# Patient Record
Sex: FEMALE | Race: WHITE | Employment: OTHER | URBAN - METROPOLITAN AREA
[De-identification: names, ages, dates, MRNs, and addresses within clinical notes are randomized per-mention and may not be internally consistent; named-entity substitution may affect disease eponyms.]

---

## 2017-08-23 ENCOUNTER — HOSPITAL ENCOUNTER (OUTPATIENT)
Dept: MRI IMAGING | Age: 72
Discharge: HOME | End: 2017-08-23
Attending: PSYCHIATRY & NEUROLOGY
Payer: MEDICARE

## 2017-08-23 VITALS — BODY MASS INDEX: 20.66 KG/M2 | WEIGHT: 128 LBS

## 2017-08-23 DIAGNOSIS — G43.019 COMMON MIGRAINE WITH INTRACTABLE MIGRAINE: ICD-10-CM

## 2017-08-23 LAB — CREAT UR-MCNC: 1.2 MG/DL (ref 0.6–1.3)

## 2017-08-23 PROCEDURE — 74011250636 HC RX REV CODE- 250/636: Performed by: PSYCHIATRY & NEUROLOGY

## 2017-08-23 PROCEDURE — 82565 ASSAY OF CREATININE: CPT

## 2017-08-23 PROCEDURE — 70553 MRI BRAIN STEM W/O & W/DYE: CPT

## 2017-08-23 PROCEDURE — A9577 INJ MULTIHANCE: HCPCS | Performed by: PSYCHIATRY & NEUROLOGY

## 2017-08-23 RX ADMIN — GADOBENATE DIMEGLUMINE 10 ML: 529 INJECTION, SOLUTION INTRAVENOUS at 12:26

## 2019-08-05 ENCOUNTER — HOSPITAL ENCOUNTER (OUTPATIENT)
Dept: GENERAL RADIOLOGY | Age: 74
Discharge: HOME | End: 2019-08-05
Attending: FAMILY MEDICINE
Payer: MEDICARE

## 2019-08-05 DIAGNOSIS — M85.89 OSTEOPENIA OF MULTIPLE SITES: ICD-10-CM

## 2019-08-05 PROCEDURE — 77080 DXA BONE DENSITY AXIAL: CPT

## 2020-07-21 ENCOUNTER — HOSPITAL ENCOUNTER (OUTPATIENT)
Dept: PHYSICAL THERAPY | Age: 75
Discharge: HOME | End: 2020-07-21
Payer: MEDICARE

## 2020-07-21 PROCEDURE — 97162 PT EVAL MOD COMPLEX 30 MIN: CPT

## 2020-07-21 NOTE — PROGRESS NOTES
Trish Whalen [address]  [facility] PHYSICAL THERAPY  [address] [facility] [address], [address] - Phone: [phone]  Fax: [phone] / [address] [facility]  Patient Name: Jonathon Gamino :    Medical   Diagnosis: Gait instability [R26.81]  Vestibular ataxic gait [R26.0] Treatment Diagnosis: Gait instability [R26.81]     Onset Date: 2020     Referral Source: Ermias Coello MD [facility]): 2020   Prior Hospitalization: See medical history Provider #: [id number]   Prior Level of Function: Independent with ADLs/IADLs, functional mobility and gait; prior h/o vertigo and migraine   Comorbidities: depression, osteoporosis, arthritis, visual impairment, hearing impairment, vertigo, migraines   Medications: Verified on Patient Summary List   The Plan of Care and following information is based on the information from the initial evaluation.   ===========================================================================================  Assessment / key information:  Patient is a 70 y.o. female who presents with complaints of sudden onset of vertigo, nausea, vomiting  and migraine 2020. Pt reports that nausea and vomiting persisted for 2-3 hours. Pt reports that once nausea and vomiting resolved, she performed self Epley maneuver, which resolved vertigo. Patient reports that migraine lasted 8 days. Pt reports that she has had intermittent dizziness with movement since that time, which occurs with rolling over in bed, looking up/reaching up into cabinets, bending down, turning around, changing elevation (sit to stand and occasionally stand to sit) and turning head. Patient demonstrates 1) forward head posture, 2) decreased C/S ROM, 3) dizziness with C/S ext, 4) dizziness with saccades/smooth pursuit/VOR, 5) negative Hallpike-Bryant and roll test, 6) impaired functional mobility and gait.   Patient would benefit from skilled PT services to address these issues and improve function. Thank you for this referral.    FOTO: 49/100  ==========================================================================================  Eval Complexity: History: HIGH Complexity :3+ comorbidities / personal factors will impact the outcome/ POC Exam:HIGH Complexity : 4+ Standardized tests and measures addressing body structure, function, activity limitation and / or participation in recreation  Presentation: MEDIUM Complexity : Evolving with changing characteristics  Clinical Decision Making:MEDIUM Complexity : FOTO score of 26-74Overall Complexity:MEDIUM    Problem List: pain affecting function, decrease ROM, decrease strength, impaired gait/ balance, decrease ADL/ functional abilitiies, decrease activity tolerance, decrease flexibility/ joint mobility and decrease transfer abilities   Treatment Plan may include any combination of the following: Therapeutic exercise, Therapeutic activities, Neuromuscular re-education, Physical agent/modality, Gait/balance training, Manual therapy, Patient education, Functional mobility training, Home safety training and Stair training  Patient / Family readiness to learn indicated by: asking questions, trying to perform skills and interest  Persons(s) to be included in education: patient (P)  Barriers to Learning/Limitations: None  Measures taken:    Patient Goal (s): \"Relief from dizziness. \"   Patient self reported health status: good  Rehabilitation Potential: good   Short Term Goals: To be accomplished in  4  weeks:  1. Patient will demonstrate compliance with HEP. 2. Patient will complete FGA to allow further assessment of gait and balance. 3. Patient will complete computerized dynamic posturography testing to allow further assessment of balance.  Long Term Goals: To be accomplished in  8  weeks:  1. Patient will demonstrate independence with HEP.   2. Patient will report 50% improvement in dizziness to allow improved activity tolerance and increased safety with functional mobility and gait. 3. Patient will score 72/100 on FOTO to indicate improved function. Frequency / Duration:   Patient to be seen  1-2  times per week for 8  weeks:  Patient / Caregiver education and instruction: activity modification and exercises    Therapist Signature: Eloisa Botello PT Date: 4/77/2103   Certification Period: 7/21/2020-10/20/2020 Time: 9:42 AM   ===========================================================================================  I certify that the above Physical Therapy Services are being furnished while the patient is under my care. I agree with the treatment plan and certify that this therapy is necessary. Physician Signature:        Date:       Time:     Please sign and return to [employer] or you may fax the signed copy to [phone]. Thank you.

## 2020-07-21 NOTE — PROGRESS NOTES
PHYSICAL THERAPY - DAILY TREATMENT NOTE    Patient Name: Hill Eagn        Date: 2020  : 1945   YES Patient  Verified  Visit #:   1     Insurance: Payor: [place] MED[place] / Plan: [place] MEDICARE PART A & B / Product Type: Medicare /      In time: 9:45 Out time: 10:17   Total Treatment Time: 32     Medicare/BCBS [place] Time Tracking (below)   Total Timed Codes (min):  0 1:1 Treatment Time:  0     TREATMENT AREA =  Peripheral vertigo    SUBJECTIVE    Pain Level (on 0 to 10 scale):  0  / 10   Medication Changes/New allergies or changes in medical history, any new surgeries or procedures? NO    If yes, update Summary List   Subjective Functional Status/Changes:  []  No changes reported     Pt reports dizziness which began 2020 as bout of vertigo and migraine. Pt reports that it took her a long time to recover. Pt reports that she has had vertigo in past and can usually do self Epley maneuver and correct vertigo. Pt reports that initially vertigo began after procedure to clean out frontal sinus ~ 3-4 years ago. Pt reports that she has been having migraines over past 4-5 years. Pt reports that she had increased nausea and vomiting with this episode of vertigo. Pt reports that nausea and vomiting lasted 2-3 hours. Pt reports that after 3-4 hours she was able to do self Epley maneuver, which resolved vertigo. Pt reports that migraine persisted for 8 days. Pt reports that dizziness persisted after migraine resolved. Pt reports that rolling over in bed, looking up/reaching up into cabinets, bending down, turning around, changing elevation (sit to stand and occasionally stand to sit) cause dizziness. Pt reports that dizziness is not present at rest, but occurs with head movements. Pt reports that she has not been driving due to dizziness. Pt reports that she underwent testing and Hearing and [address] [place] [address] and was told that she needs hearing aids.        OBJECTIVE    Physical Therapy Evaluation - Vestibular    Posture:  [] WNL      [x] Forward head    [x] Protracted shoulders    [] Retracted shoulders  [] Kyphosis:  [x] increased   [] decreased   [] Lordosis:   [] increased   [x] decreased  Other:    C/S ROM: [] WFL    [x] Limited    Describe: Decreased motion all directions; dizziness with C/S ext    Strength: [x] [facility]    [] Limited    Describe:    Optional Tests:  Sensation:  [x] Intact [] Diminished    Describe:    Proprioception: [x] Intact [] Diminished    Describe:    Coordination Testing:       Disdiadochokinesia [x] WFL    [] Impaired    Describe:       Heel - Tong  [] WFL    [] Impaired    Describe:       FNF   [] WFL    [] Impaired    Describe: Toe Tap   [x] [facility]    [] Impaired    Describe:    Oculomotor Tests: (Fixation Not Blocked)       Ocular ROM:   [x] WFL    [] Limited    Describe:       Spontaneous Nystag. [x] Neg     [] Pos    [] Left    [] Right       Gaze Holding Nystag. [x] Neg     [] Pos    [] Left    [] Right        Smooth Pursuit  [x] Neg     [] Pos    [] Left    [] Right  + dizziness       Saccades   [x] Neg     [] Pos    [] Left    [] Right  + dizziness       VOR - Slow Head Mvmt [x] Neg     [] Pos    [] Left    [] Right        VOR - Fast Head Mvmt [x] Neg     [] Pos    [] Left    [] Right        Head Thrust  [x] Neg     [] Pos    [] Left    [] Right  + dizziness       Static Visual Acuity [] Neg     [] Pos    [] Left    [] Right        Dynamic Visual Acuity [] Neg     [] Pos    [] Left    [] Right     Other Special Tests:       Vertebral Artery Testing [x] Neg     [] Pos    [] Left    [] Right       Hallpike-[employer] Maneuver [x] Neg     [] Pos    [] Left    [] Right + dizziness       Roll Test   [x] Neg     [] Pos    [] Left    [] Right + dizziness       Gonzalez Balance Scale [] Neg     [] Pos    Score:       Dynamic Gait Index [] Neg     [] Pos    Score:       Functional Gait Assess.  [] Neg     [] Pos    Score:    Balance Standard Testing (Eyes Open/Eyes Closed - EO/EC)       Romberg   [x] WFL    [] Pos    Describe: 30\"/30\"           Stand on Foam  [x] WFL    [] Pos    Describe: 30\"/30\"       Standing on Rail  [] WFL    [] Pos    Describe:        Sharpened Romberg [x] WFL    [] Pos    Describe: 30\" B EO       Single Leg Stand  [x] WFL    [] Pos    Describe: 7\" right/20\" left     Motion Sensitivity:  [] Neg     [] Pos    Describe:    Computerized Dynamic Posturography:        [] Not Tested    [] WFL    Score:     Other Tests:    During eval min Patient Education:  NO  Reviewed HEP   []  Progressed/Changed HEP based on:   Discussed POC     Other Objective/Functional Measures:    See eval     Post Treatment Pain Level (on 0 to 10) scale:   0  / 10     ASSESSMENT    Assessment/Changes in Function:     See plan of care    Justification for Eval Code Complexity:  Patient History : HIGH - depression, osteoporosis, arthritis, visual impairment, hearing impairment, vertigo, migraines  Examination HIGH - See objective  Clinical Presentation: MEDIUM  Clinical Decision Making : MEDIUM - FOTO 49/100     []  See Progress Note/Recertification   Patient will continue to benefit from skilled PT services: see plan of care   Progress toward goals / Updated goals:    See plan of care       PLAN    [x]  Upgrade activities as tolerated YES Continue plan of care   []  Discharge due to :    []  Other:      Therapist: Virginia Olivia, PT    Date: 7/21/2020 Time: 9:41 AM

## 2020-07-23 ENCOUNTER — HOSPITAL ENCOUNTER (OUTPATIENT)
Dept: PHYSICAL THERAPY | Age: 75
Discharge: HOME | End: 2020-07-23
Payer: MEDICARE

## 2020-07-23 PROCEDURE — 97112 NEUROMUSCULAR REEDUCATION: CPT

## 2020-07-23 PROCEDURE — 97530 THERAPEUTIC ACTIVITIES: CPT

## 2020-07-23 NOTE — PROGRESS NOTES
PHYSICAL THERAPY - DAILY TREATMENT NOTE    Patient Name: Rafael Michael        Date: 2020  : 1945   YES Patient  Verified  Visit #:  2     Insurance: Payor: Alfred Banks / Plan: [place] MEDICARE PART A & B / Product Type: Medicare /      In time: 1:45 Out time: 2:25   Total Treatment Time: 40     Medicare/BCBS [place] Time Tracking (below)   Total Timed Codes (min):  40 1:1 Treatment Time:  40     TREATMENT AREA =  Gait instability [R26.81]  Vestibular ataxic gait [R26.0]  SUBJECTIVE    Pain Level (on 0 to 10 scale):  0  / 10   Medication Changes/New allergies or changes in medical history, any new surgeries or procedures? NO    If yes, update Summary List   Subjective Functional Status/Changes:  []  No changes reported     Patient reports that she continues to have intermittent dizziness.           OBJECTIVE    15 min Therapeutic Activity: FTSST, FGA, 10MWT   Rationale: assess functional mobility and gait to improve the patient's ability to perform ADLs/IADLs, functional mobility and gait safely and independently without increased pain/symptoms      25 min Neuromuscular Re-ed: VSE, EO/EC balance, gait ex   Rationale: improve balance to improve the patient's ability to perform ADLs/IADLs, functional mobility and gait safely and independently without increased pain/symptoms      During NM min Patient Education:  YES  Reviewed HEP   [x]  Progressed/Changed HEP based on:   Initiated HEP (copy in chart)     Other Objective/Functional Measures:    FGA =   FTSST = 13\"  10MWT = 7\"    Initiated VSE, EO/EC balance on floor/foam, and gait ex per flowsheet     Post Treatment Pain Level (on 0 to 10) scale:   0  / 10     ASSESSMENT    Assessment/Changes in Function:     Tolerated exercises without complaints     []  See Progress Note/Recertification   Patient will continue to benefit from skilled PT services to modify and progress therapeutic interventions, address functional mobility deficits, address ROM deficits, address strength deficits, analyze and address soft tissue restrictions, analyze and cue movement patterns, analyze and modify body mechanics/ergonomics, assess and modify postural abnormalities, address imbalance/dizziness and instruct in home and community integration to attain remaining goals. Progress toward goals / Updated goals:    Progressing toward goals: · Short Term Goals: To be accomplished in  4  weeks:  1. Patient will demonstrate compliance with HEP. - Initiated HEP  2. Patient will complete FGA to allow further assessment of gait and balance. - FGA = 23/30  3. Patient will complete computerized dynamic posturography testing to allow further assessment of balance. · Long Term Goals: To be accomplished in  8  weeks:  1. Patient will demonstrate independence with HEP. 2. Patient will report 50% improvement in dizziness to allow improved activity tolerance and increased safety with functional mobility and gait. 3. Patient will score 72/100 on FOTO to indicate improved function.        PLAN    [x]  Upgrade activities as tolerated YES Continue plan of care   []  Discharge due to :    []  Other:      Therapist: Yaima Cummings PT    Date: 7/23/2020 Time: 1:52 PM     Future Appointments   Date Time Provider Yandy Ramos   7/31/2020  9:30 AM Irina Henry PT [facility]   8/4/2020  8:45 AM Irina Henry PT [facility]   8/5/2020  8:00 AM Irina Henry PT [facility]   8/11/2020  8:00 AM HITESH Lea [facility]   8/13/2020  8:00 AM Irina Henry PT [facility]   8/19/2020  8:00 AM Irina Henry PT [facility]   8/20/2020  8:45 AM Irina Henry PT [facility]   8/25/2020  8:00 AM Nathan Guerrero PT [facility]   8/26/2020  8:00 AM Nathan Guerrero, PT [facility]

## 2020-07-31 ENCOUNTER — HOSPITAL ENCOUNTER (OUTPATIENT)
Dept: PHYSICAL THERAPY | Age: 75
Discharge: HOME | End: 2020-07-31
Payer: MEDICARE

## 2020-07-31 PROCEDURE — 97112 NEUROMUSCULAR REEDUCATION: CPT

## 2020-07-31 PROCEDURE — 97110 THERAPEUTIC EXERCISES: CPT

## 2020-07-31 NOTE — PROGRESS NOTES
PHYSICAL THERAPY - DAILY TREATMENT NOTE    Patient Name: Mehreen Canela        Date: 2020  : 1945   YES Patient  Verified  Visit #:   3   of     Insurance: Payor: Geoff Contreras / Plan: [place] MEDICARE PART A & B / Product Type: Medicare /      In time: 9:30 Out time: 10:10   Total Treatment Time: 40     Medicare/BCBS [place] Time Tracking (below)   Total Timed Codes (min):  40 1:1 Treatment Time:  40     TREATMENT AREA =  Gait instability [R26.81]  Vestibular ataxic gait [R26.0]  SUBJECTIVE    Pain Level (on 0 to 10 scale):  4  / 10   Medication Changes/New allergies or changes in medical history, any new surgeries or procedures? NO    If yes, update Summary List   Subjective Functional Status/Changes:  []  No changes reported     Pt reports 4/10 headache, mild dizziness. Pt reports compliance with HEP, reports that EO/EC balance exercises on pillow are difficult. Pt reports episode of dizziness/nausea while doing dishes last week, reports that she had to go and lie down. OBJECTIVE    32 min Therapeutic Exercise:  [x]  See flow sheet   Rationale:      increase ROM, increase strength, improve coordination, improve balance and increase proprioception to improve the patients ability to perform ADLs/IADLs, functional mobility and gait safely and independently without increased pain/symptoms     8 min Neuromuscular Re-ed: Gait ex per flowsheet   Rationale: improve balance to improve the patient's ability to perform ADLs/IADLs, functional obility and gait safely and independently without increased pain/symptoms      During TE min Patient Education:  YES  Reviewed HEP   [x]  Progressed/Changed HEP based on:   Initiated LE strengthening HEP (see below)     Other Objective/Functional Measures:    Initiated Nustep and LE strengthening exercises per flowsheet    Access Code: [email or web address]   URL: [email or web address]   Date: 2020   Prepared by: Joseluis Hastings     Exercises Mini Squat with Counter Support - 10 reps - 1 sets - 3 second hold - 1x daily - 7x weekly   Standing Hip Flexion with Counter Support - 10 reps - 1 sets - 3 second hold - 1x daily - 7x weekly   Standing Hip Abduction with Counter Support - 10 reps - 1 sets - 3 second hold - 1x daily - 7x weekly   Standing Hip Extension with Counter Support - 10 reps - 1 sets - 3 second hold - 1x daily - 7x weekly   Lateral Step Up with Counter Support - 10 reps - 1 sets - 1x daily - 7x weekly   Forward Step Up with Counter Support - 10 reps - 1 sets - 1x daily - 7x weekly      Post Treatment Pain Level (on 0 to 10) scale:   4  / 10     ASSESSMENT    Assessment/Changes in Function:     Tolerated exercises without complaints except one episode of dizziness when turning around to perform lateral step ups on other side     []  See Progress Note/Recertification   Patient will continue to benefit from skilled PT services to modify and progress therapeutic interventions, address functional mobility deficits, address ROM deficits, address strength deficits, analyze and address soft tissue restrictions, analyze and cue movement patterns, analyze and modify body mechanics/ergonomics, assess and modify postural abnormalities, address imbalance/dizziness and instruct in home and community integration to attain remaining goals. Progress toward goals / Updated goals:    Progressing toward goals: · Short Term Goals: To be accomplished in  4  weeks:  1. Patient will demonstrate compliance with HEP. - Initiated LE strengthening HEP  2. Patient will complete FGA to allow further assessment of gait and balance. - Performed gait ex per flowsheet  3. Patient will complete computerized dynamic posturography testing to allow further assessment of balance. · Long Term Goals: To be accomplished in  8  weeks:  1. Patient will demonstrate independence with HEP.   2. Patient will report 50% improvement in dizziness to allow improved activity tolerance and increased safety with functional mobility and gait. 3. Patient will score 72/100 on FOTO to indicate improved function.        PLAN    [x]  Upgrade activities as tolerated YES Continue plan of care   []  Discharge due to :    []  Other:      Therapist: Emily Ramos PT    Date: 7/31/2020 Time: 9:36 AM     Future Appointments   Date Time Provider Yandy Ramos   8/4/2020  8:45 AM Lillie Dutton, PT [facility]   8/5/2020  8:00 AM Lillie Dutton, PT [facility]   8/11/2020  8:00 AM Sandhya Navarrete, PT [facility]   8/13/2020  8:00 AM Lillie Dutton, PT [facility]   8/19/2020  8:00 AM Lillie Dutton, PT [facility]   8/20/2020  8:45 AM Lillie Dutton, PT [facility]   8/25/2020  8:00 AM Sandhya Low, PT [facility]   8/26/2020  8:00 AM Sandhya Low, PT [facility]

## 2020-08-04 ENCOUNTER — APPOINTMENT (OUTPATIENT)
Dept: PHYSICAL THERAPY | Age: 75
End: 2020-08-04
Payer: MEDICARE

## 2020-08-05 ENCOUNTER — HOSPITAL ENCOUNTER (OUTPATIENT)
Dept: PHYSICAL THERAPY | Age: 75
Discharge: HOME | End: 2020-08-05
Payer: MEDICARE

## 2020-08-05 PROCEDURE — 97110 THERAPEUTIC EXERCISES: CPT

## 2020-08-05 PROCEDURE — 97112 NEUROMUSCULAR REEDUCATION: CPT

## 2020-08-05 NOTE — PROGRESS NOTES
PHYSICAL THERAPY - DAILY TREATMENT NOTE    Patient Name: Bernie Thomson        Date: 2020  : 1945   YES Patient  Verified  Visit #:   4     Insurance: Payor: Theo Dobbs / Plan: [place] MEDICARE PART A & B / Product Type: Medicare /      In time: 8:00 Out time: 8:42   Total Treatment Time: 42     Medicare/BCBS [place] Time Tracking (below)   Total Timed Codes (min):  42 1:1 Treatment Time:  42     TREATMENT AREA =  Gait instability [R26.81]  Vestibular ataxic gait [R26.0]  SUBJECTIVE    Pain Level (on 0 to 10 scale):  0  / 10   Medication Changes/New allergies or changes in medical history, any new surgeries or procedures? NO    If yes, update Summary List   Subjective Functional Status/Changes:  []  No changes reported     Pt reports mild dizziness, reports compliance with HEP. OBJECTIVE    8 min Therapeutic Exercise:  [x]  See flow sheet   Rationale:      increase ROM, increase strength, improve coordination, improve balance and increase proprioception to improve the patients ability to perform ADLs/IADLs, functional mobility and gait safely and independently without increased pain/symptoms     34 min Neuromuscular Re-ed: SOT, VSE, VVI   Rationale: assess and improve balance and improve vestibular function to improve the patient's ability to perform ADLs/IADLs, functional mobility and gait safely and independently without increased pain/symptoms    During TE/NM min Patient Education:  YES  Reviewed HEP   []  Progressed/Changed HEP based on:   Cont HEP; progressed VSE/added VVI and SLS (see below)     Other Objective/Functional Measures:    Exercises per flowsheet  Initiated VSE standing and VVI seated, SLS    Access Code: [email or web address]   URL: [email or web address]   Date: 2020   Prepared by: Barb Ivey     Exercises   Single Leg Stance - 2-3 reps - 1 sets - 30 second hold - 1x daily - 7x weekly     SOT composite WNL; abnormal performance on trial 1 of condition 4, fall on trial 1 of condition 6; abnormal VIS score     Post Treatment Pain Level (on 0 to 10) scale:   0  / 10     ASSESSMENT    Assessment/Changes in Function:     Tolerated session with complaints of moderate dizziness during SOT    Abnormal VIS score on SOT indicates impaired ability to effectively use input cues from visual system to maintain balance       []  See Progress Note/Recertification   Patient will continue to benefit from skilled PT services to modify and progress therapeutic interventions, address functional mobility deficits, address ROM deficits, address strength deficits, analyze and address soft tissue restrictions, analyze and cue movement patterns, analyze and modify body mechanics/ergonomics, assess and modify postural abnormalities, address imbalance/dizziness and instruct in home and community integration to attain remaining goals. Progress toward goals / Updated goals:    Progressing toward goals: · Short Term Goals: To be accomplished in  4  weeks:  1. Patient will demonstrate compliance with HEP.  - Reports compliance  2. Patient will complete FGA to allow further assessment of gait and balance.   3. Patient will complete computerized dynamic posturography testing to allow further assessment of balance. - Completed SOT  · Long Term Goals: To be accomplished in  8  weeks:  1. Patient will demonstrate independence with HEP. 2. Patient will report 50% improvement in dizziness to allow improved activity tolerance and increased safety with functional mobility and gait. 3. Patient will score 72/100 on FOTO to indicate improved function.        PLAN    [x]  Upgrade activities as tolerated YES Continue plan of care   []  Discharge due to :    []  Other:      Therapist: Luc Garcia PT    Date: 8/5/2020 Time: 8:05 AM     Future Appointments   Date Time Provider Yandy Ramos   8/11/2020  8:00 AM Cole Barrios, PT [facility]   8/13/2020  8:00 AM Elvie Mcdonald PT [facility]   8/19/2020  8:00 AM Irina Murillo, PT [facility]   8/20/2020  8:45 AM Irina Murillo, PT [facility]   8/25/2020  8:00 AM Rosetta Penn, PT [facility]   8/26/2020  8:00 AM Rosetta Penn, PT [facility]

## 2020-08-11 ENCOUNTER — HOSPITAL ENCOUNTER (OUTPATIENT)
Dept: PHYSICAL THERAPY | Age: 75
Discharge: HOME | End: 2020-08-11
Payer: MEDICARE

## 2020-08-11 PROCEDURE — 95992 CANALITH REPOSITIONING PROC: CPT

## 2020-08-11 NOTE — PROGRESS NOTES
PHYSICAL THERAPY - DAILY TREATMENT NOTE    Patient Name: Mckenna Rodriguez        Date: 2020  : 1945   YES Patient  Verified  Visit #:     Insurance: Payor: Eleazar High / Plan: [place] MED[place]ARE PART A & B / Product Type: Medicare /      In time: 8:01 Out time: 8:19   Total Treatment Time: 18     Medicare/BCBS [place] Time Tracking (below)   Total Timed Codes (min):  18 1:1 Treatment Time:  18     TREATMENT AREA =   Gait instability [R26.81]  Vestibular ataxic gait [R26.0]    SUBJECTIVE    Pain Level (on 0 to 10 scale):  0  / 10   Medication Changes/New allergies or changes in medical history, any new surgeries or procedures?    no    If yes, update Summary List   Subjective Functional Status/Changes:  []  No changes reported     \"Ever since the last treatment, I have felt on the verge of vertigo when I lie down or get out of bed. \"          OBJECTIVE      18 min Manual Therapy: Canalith Repositioning maneuver (CRM) performed on the right due to patient being symptomatic. Rationale:      correct positional vertigo to improve patient's ability to perform ADL's, gait,, and functional mobility with ADL's, gait, and functional mobility with increased safety and decreased symptoms. min Patient Education:  YES  Reviewed HEP   []  Progressed/Changed HEP based on:   Educated patient on post CRM instructions. Other Objective/Functional Measures:    Dis-Hallpike test (-) for nystagmus B but reports vertigo in the head right position. Roll test (-) B.      Post Treatment Pain Level (on 0 to 10) scale:   0  / 10     ASSESSMENT    Assessment/Changes in Function:     Patient verbalized good understanding of post CRM instructions      []  See Progress Note/Recertification   Patient will continue to benefit from skilled PT services to modify and progress therapeutic interventions, address functional mobility deficits, address ROM deficits, address strength deficits, analyze and address soft tissue restrictions, analyze and cue movement patterns, analyze and modify body mechanics/ergonomics, assess and modify postural abnormalities, address imbalance/dizziness and instruct in home and community integration to attain remaining goals. Progress toward goals / Updated goals: · Short Term Goals: To be accomplished in  4  weeks:  1. Patient will demonstrate compliance with HEP. 2. Patient will complete FGA to allow further assessment of gait and balance.   3. Patient will complete computerized dynamic posturography testing to allow further assessment of balance.  - Completed SOT previously     PLAN    [x]  Upgrade activities as tolerated YES Continue plan of care   []  Discharge due to :    []  Other:      Therapist: Tawanda Rodriges, PT    Date: 8/11/2020 Time: 7:51 AM     Future Appointments   Date Time Provider Yandy Ramos   8/11/2020  8:00 AM Sandhya Navarrete, PT [facility]   8/13/2020  8:00 AM Lillie Dutton, PT [facility]   8/19/2020  8:00 AM Lillie Dutton, PT [facility]   8/20/2020  8:45 AM Lillie Dutton, PT [facility]   8/25/2020  8:00 AM Sandhya Navarrete, PT [facility]   8/26/2020  8:00 AM Sandhya Navarrete, PT [facility]

## 2020-08-13 ENCOUNTER — HOSPITAL ENCOUNTER (OUTPATIENT)
Dept: PHYSICAL THERAPY | Age: 75
Discharge: HOME | End: 2020-08-13
Payer: MEDICARE

## 2020-08-13 PROCEDURE — 97112 NEUROMUSCULAR REEDUCATION: CPT

## 2020-08-13 PROCEDURE — 97110 THERAPEUTIC EXERCISES: CPT

## 2020-08-13 NOTE — PROGRESS NOTES
PHYSICAL THERAPY - DAILY TREATMENT NOTE    Patient Name: Bin Hollingsworth        Date: 2020  : 1945   YES Patient  Verified  Visit #:   6     Insurance: Payor: Dennie Floro / Plan: [place] MEDICARE PART A & B / Product Type: Medicare /      In time: 8:00 Out time: 8:40   Total Treatment Time: 40     Medicare/BCBS [place] Time Tracking (below)   Total Timed Codes (min):  40 1:1 Treatment Time:  40     TREATMENT AREA =  Gait instability [R26.81]  Vestibular ataxic gait [R26.0]  SUBJECTIVE    Pain Level (on 0 to 10 scale):  0  / 10   Medication Changes/New allergies or changes in medical history, any new surgeries or procedures? NO    If yes, update Summary List   Subjective Functional Status/Changes:  []  No changes reported     Pt reports moderate dizziness. Pt reports that dizziness has been increased since 2020 session. Pt reports no improvement in dizziness since CRM performed 2020. Pt reports that when she wakes up she feels like she is going to have an episode of vertigo, but has not actually had any episodes of vertigo. Pt reports that she has not been doing well with balance exercises at home, reports that she does not feel as steady as she did previously.         OBJECTIVE    30 min Therapeutic Exercise:  [x]  See flow sheet   Rationale:      increase ROM, increase strength, improve coordination, improve balance and increase proprioception to improve the patients ability to perform ADLs/IADLs, functional mobility and gait safely and independently without increased pain/symptoms     10 min Neuromuscular Re-ed: Tap-ups, EO/EC balance on floor/foam, CC   Rationale: improve balance to improve the patient's ability to perform ADLs/IADLs, functional mobility and gait safely and independently without increased pain/symptoms      During TE min Patient Education:  YES  Reviewed HEP   []  Progressed/Changed HEP based on:   Cont HEP     Other Objective/Functional Measures:    Exercises per flowsheet  Increased reps LE strengthening ex per flowsheet  Did not progress EO/EC balance ex due to c/o increased dizziness since 8/5/2020 session  Substituted CC 1, 2 for VSE/VVI due to c/o increased dizziness since 8/5/2020 session - reported mild increase in dizziness with CC 1, 2     Post Treatment Pain Level (on 0 to 10) scale:   0  / 10     ASSESSMENT    Assessment/Changes in Function:     Tolerated exercises without c/o increased symptoms     []  See Progress Note/Recertification   Patient will continue to benefit from skilled PT services to modify and progress therapeutic interventions, address functional mobility deficits, address ROM deficits, address strength deficits, analyze and address soft tissue restrictions, analyze and cue movement patterns, analyze and modify body mechanics/ergonomics, assess and modify postural abnormalities, address imbalance/dizziness and instruct in home and community integration to attain remaining goals. Progress toward goals / Updated goals:    Progressing toward goals: · Short Term Goals: To be accomplished in  4  weeks:  1. Patient will demonstrate compliance with HEP. - Reports partial compliance with HEP  2. Patient will complete FGA to allow further assessment of gait and balance. - Completed previously  3. Patient will complete computerized dynamic posturography testing to allow further assessment of balance. - Completed SOT previously  · Long Term Goals: To be accomplished in  8  weeks:  1. Patient will demonstrate independence with HEP. 2. Patient will report 50% improvement in dizziness to allow improved activity tolerance and increased safety with functional mobility and gait. - Reports no improvement in dizziness  3. Patient will score 72/100 on FOTO to indicate improved function.        PLAN    [x]  Upgrade activities as tolerated YES Continue plan of care   []  Discharge due to :    []  Other:      Therapist: Bisi Downing PT    Date: 8/13/2020 Time: 8:05 AM     Future Appointments   Date Time Provider Yandy Ramos   8/19/2020  8:00 AM Michelle Jimenez, PT [facility]   8/20/2020  8:45 AM Michelle Jimenez, PT [facility]   8/25/2020  8:00 AM [facility]   8/26/2020  8:00 AM Tariq Swain, PT [facility]

## 2020-08-19 ENCOUNTER — HOSPITAL ENCOUNTER (OUTPATIENT)
Dept: PHYSICAL THERAPY | Age: 75
Discharge: HOME | End: 2020-08-19
Payer: MEDICARE

## 2020-08-19 PROCEDURE — 97110 THERAPEUTIC EXERCISES: CPT

## 2020-08-19 PROCEDURE — 97112 NEUROMUSCULAR REEDUCATION: CPT

## 2020-08-19 NOTE — PROGRESS NOTES
PHYSICAL THERAPY - DAILY TREATMENT NOTE    Patient Name: Mehreen Canela        Date: 2020  : 1945   YES Patient  Verified  Visit #:     Insurance: Payor: Geoff Contreras / Plan: [place] MEDICARE PART A & B / Product Type: Medicare /      In time: 8:03 Out time: 8:45   Total Treatment Time: 42     Medicare/BCBS [place] Time Tracking (below)   Total Timed Codes (min):  42 1:1 Treatment Time:  42     TREATMENT AREA =  Gait instability [R26.81]  Vestibular ataxic gait [R26.0]  SUBJECTIVE    Pain Level (on 0 to 10 scale):  0  / 10   Medication Changes/New allergies or changes in medical history, any new surgeries or procedures? NO    If yes, update Summary List   Subjective Functional Status/Changes:  []  No changes reported     Pt reports mild dizziness. Pt reports compliance with HEP.         OBJECTIVE    29  min Therapeutic Exercise:  [x]  See flow sheet   Rationale:      increase ROM, increase strength, improve coordination, improve balance and increase proprioception to improve the patients ability to perform ADLs/IADLs, functional mobility and gait safely and independently without increased pain/symptoms     13 min Neuromuscular Re-ed: CC, EO/EC balance, tap-ups, gait ex   Rationale: improve balance to improve the patient's ability to perform ADLs/IADLs, functional mobility and gait safely and independently without increased pain/symptoms      During TE min Patient Education:  YES  Reviewed HEP   []  Progressed/Changed HEP based on:   Cont HEP     Other Objective/Functional Measures:    Exercises per flowsheet  Provided verbal cues for proper technique with mini-squats  Progressed EO/EC balance on floor/foam per flowsheet  Added LE stretching at end of session due to c/o muscle soreness with LE strengthening exercises     Post Treatment Pain Level (on 0 to 10) scale:   0  / 10     ASSESSMENT    Assessment/Changes in Function:     Tolerated exercises without complaints     []  See Progress Note/Recertification   Patient will continue to benefit from skilled PT services to modify and progress therapeutic interventions, address functional mobility deficits, address ROM deficits, address strength deficits, analyze and address soft tissue restrictions, analyze and cue movement patterns, analyze and modify body mechanics/ergonomics, assess and modify postural abnormalities, address imbalance/dizziness and instruct in home and community integration to attain remaining goals. Progress toward goals / Updated goals:    Progressing toward goals: · Short Term Goals: To be accomplished in  4  weeks:  1. Patient will demonstrate compliance with HEP. - Reports compliance with HEP  2. Patient will complete FGA to allow further assessment of gait and balance. - Completed FGA previously  3. Patient will complete computerized dynamic posturography testing to allow further assessment of balance. - Completed SOT previously  · Long Term Goals: To be accomplished in  8  weeks:  1. Patient will demonstrate independence with HEP. 2. Patient will report 50% improvement in dizziness to allow improved activity tolerance and increased safety with functional mobility and gait. - Reports mild dizziness  3.  Patient will score 72/100 on FOTO to indicate improved function.        PLAN    [x]  Upgrade activities as tolerated YES Continue plan of care   []  Discharge due to :    []  Other:      Therapist: Freddie Madrigal PT    Date: 8/19/2020 Time: 8:05 AM     Future Appointments   Date Time Provider Yandy Ramos   8/20/2020  8:45 AM Ольга Syed PT [facility]   8/25/2020  8:00 AM HITESH Rojas [facility]   8/26/2020  8:00 AM Jaleesa Mckeon PT [facility]

## 2020-08-20 ENCOUNTER — HOSPITAL ENCOUNTER (OUTPATIENT)
Dept: PHYSICAL THERAPY | Age: 75
Discharge: HOME | End: 2020-08-20
Payer: MEDICARE

## 2020-08-20 PROCEDURE — 97112 NEUROMUSCULAR REEDUCATION: CPT

## 2020-08-20 PROCEDURE — 97110 THERAPEUTIC EXERCISES: CPT

## 2020-08-20 PROCEDURE — 97530 THERAPEUTIC ACTIVITIES: CPT

## 2020-08-20 NOTE — PROGRESS NOTES
PHYSICAL THERAPY - DAILY TREATMENT NOTE    Patient Name: Scott Sampson        Date: 2020  : 1945   YES Patient  Verified  Visit #:   8     Insurance: Payor: Pavan Thurston / Plan: [place] MEDICARE PART A & B / Product Type: Medicare /      In time: 8:47 Out time: 9:25   Total Treatment Time: 38     Medicare/BCBS [place] Time Tracking (below)   Total Timed Codes (min):  38 1:1 Treatment Time:  38     TREATMENT AREA =  Gait instability [R26.81]  Vestibular ataxic gait [R26.0]  SUBJECTIVE    Pain Level (on 0 to 10 scale):  0  / 10   Medication Changes/New allergies or changes in medical history, any new surgeries or procedures? NO    If yes, update Summary List   Subjective Functional Status/Changes:  []  No changes reported     Pt denies pain or dizziness, reports neck stiffness which she attributes to sleeping position. Pt reports that continues to have dizziness with looking down or bending forward, occasionally with looking up.        OBJECTIVE    15 min Therapeutic Exercise:  [x]  See flow sheet   Rationale:      increase ROM, increase strength, improve coordination, improve balance and increase proprioception to improve the patients ability to perform ADLs/IADLs, functional mobility and gait safely and independently without increased pain/symptoms     15 min Therapeutic Activity: FOTO, FTSST, FGA, 10MWT   Rationale: assess ADL/IADL function, functional mobility and gait to improve the patient's ability to perform ADLs/IADLs, functional mobility and gait safely and independently without increased pain/symptoms      8 min Neuromuscular Re-ed: CC, rail stance EO/EC   Rationale: decrease dizziness through habituation and improve balance to improve the patient's ability to perform ADLs/IADLs, functional mobility and gait safely and independently without increased pain/symptoms      During NM min Patient Education:  YES  Reviewed HEP   [x]  Progressed/Changed HEP based on:   Cont HEP; added CC 4, 6     Other Objective/Functional Measures:    Exercises per flowsheet - added CC 4, 6 x 5 reps each    FOTO = 69/100  FTSST = 12\"  FGA = 22/30  10MWT = 6\" (1.0 m/s)  Rail stance EO/EC = 30\"/30\"     Post Treatment Pain Level (on 0 to 10) scale:   0  / 10     ASSESSMENT    Assessment/Changes in Function:     See continued plan of care     [x]  See Progress Note/Recertification   Patient will continue to benefit from skilled PT services to modify and progress therapeutic interventions, address functional mobility deficits, address ROM deficits, address strength deficits, analyze and address soft tissue restrictions, analyze and cue movement patterns, analyze and modify body mechanics/ergonomics, assess and modify postural abnormalities, address imbalance/dizziness and instruct in home and community integration to attain remaining goals.    Progress toward goals / Updated goals:    See continued plan of care       PLAN    [x]  Upgrade activities as tolerated YES Continue plan of care   []  Discharge due to :    []  Other:      Therapist: Cristal Galloway PT    Date: 8/20/2020 Time: 8:47 AM     Future Appointments   Date Time Provider Yandy Ramos   8/25/2020  8:00 AM Tariq Swain, PT [facility]   8/26/2020  8:00 AM Tariq Swain, PT [facility]   9/1/2020  8:00 AM Michelle Jimenez, PT [facility]   9/3/2020  8:00 AM [facility]   9/8/2020  8:00 AM Michelle Jimenez, PT [facility]   9/10/2020  8:00 AM Tariq Swain, PT [facility]   9/15/2020  8:00 AM Michelle Jimenez, PT [facility]   9/17/2020  8:00 AM Michelle Jimenez, PT [facility]

## 2020-08-20 NOTE — PROGRESS NOTES
Trish [address]  [facility] PHYSICAL THERAPY  [address], [address] - Phone: [phone]  Fax: [phone] [facility] PHYSICAL [facility]ER[facility]          Patient Name: Juan Mar : [id number]   Treatment/Medical Diagnosis: Gait instability [R26.81]  Vestibular ataxic gait [R26.0]   Onset Date: 2020    Referral Source: Matthew Jaimes MD Start of [facility]): 2020   Prior Hospitalization: See Medical History Provider #: [id number]   Prior Level of Function: Independent with ADLs/IADLs, functional mobility and gait; prior h/o vertigo and migraine   Comorbidities: Depression, osteoporosis, arthritis, visual impairment, hearing impairment, vertigo, migraines   Medications: Verified on Patient Summary List   Visits from [facility]: 8 Missed Visits: 0     Goal/Measure of Progress Goal Met? 1. Patient will demonstrate compliance with HEP. Status at last Eval: NA Current Status: Compliant with HEP yes   2. Patient will complete FGA to allow further assessment of gait and balance. Status at last Eval: NA Current Status: FGA = 17 on 2020, 22 on 2020 yes   3. Patient will complete computerized dynamic posturography testing to allow further assessment of balance. Status at last Eval: NA Current Status: SOT composite = 65 (WNL); abnormal VIS score; MCT/ADT deferred due to dizziness during SOT progressing toward goal     Key Functional Changes/Progress: Patient has progressed well with exercises in clinic, and demonstrates compliance with HEP. FOTO has increased from 49/100 to 69/100, indicating improved function. FTSST improved slightly from 13\" to 12\". 10MWT improved slightly from 7\" (0.86 m/s) to 6\" (1.0 m/s). FGA improved from  to , indicating decreased fall risk with ambulation.   Static standing balance improved to WNL with patient able to maintain single leg stance 30\"B, sharpened Romberg 30\"B, rail stance eyes open and eyes closed 30\", foam stance eyes open and eyes closed 30\" and Romberg eyes open and eyes closed 30\". Patient continues to report intermittent dizziness with looking down/bending forward and occasionally with looking up. Problem List: pain affecting function, decrease ROM, decrease strength, impaired gait/ balance, decrease ADL/ functional abilitiies, decrease activity tolerance, decrease flexibility/ joint mobility and decrease transfer abilities   Treatment Plan may include any combination of the following: Therapeutic exercise, Therapeutic activities, Neuromuscular re-education, Physical agent/modality, Gait/balance training, Manual therapy, Patient education, Functional mobility training, Home safety training and Stair training  Patient Goal(s) has been updated and includes: \"Get rid of dizziness. \"    Goals for this certification period include and are to be achieved in   2-4  weeks:  1. Patient will demonstrate independence with HEP. 2. Patient will report 50% improvement in dizziness to allow improved activity tolerance and increased safety with functional mobility and gait. 3. Patient will score 72/100 on FOTO to indicate improved function. Frequency / Duration:   Patient to be seen   2   times per week for   2-4    weeks:    Assessments/Recommendations: Patient would benefit from continued skilled PT services to address dizziness, decreased strength, impaired balance and impaired functional mobility/gait to improve the patient's ability to perform ADLs/IADLs, functional mobility and gait safely and independently without increased pain/symptoms. If you have any questions/comments please contact us directly at [phone]. Thank you for allowing us to assist in the care of your patient.     Therapist Signature: Luc Garcia PT Date: 1/60/1447   Certification Period:  Reporting Period: 7/21/2020-10/20/2020  7/21/2020-8/20/2020 Time: 9:13 AM   NOTE TO PHYSICIAN:  PLEASE COMPLETE THE ORDERS BELOW AND FAX TO   [facility]: [phone]. If you are unable to process this request in 24 hours please contact our office: [phone].    ___ I have read the above report and request that my patient continue as recommended.   ___ I have read the above report and request that my patient continue therapy with the following changes/special instructions: ________________________________________________   ___ I have read the above report and request that my patient be discharged from therapy.      Physician Signature:        Date:       Time:

## 2020-08-25 ENCOUNTER — HOSPITAL ENCOUNTER (OUTPATIENT)
Dept: PHYSICAL THERAPY | Age: 75
Discharge: HOME | End: 2020-08-25
Payer: MEDICARE

## 2020-08-25 PROCEDURE — 97112 NEUROMUSCULAR REEDUCATION: CPT

## 2020-08-25 NOTE — PROGRESS NOTES
PHYSICAL THERAPY - DAILY TREATMENT NOTE    Patient Name: Scott Sampson        Date: 2020  : 1945   YES Patient  Verified  Visit #:     Insurance: Payor: Pavan Karena / Plan: [place] MEDICARE PART A & B / Product Type: Medicare /      In time: 8:01 Out time: 8:42   Total Treatment Time: 41     Medicare/BCBS [place] Time Tracking (below)   Total Timed Codes (min):  41 1:1 Treatment Time:  41     TREATMENT AREA =  Gait instability [R26.81]  Vestibular ataxic gait [R26.0]    SUBJECTIVE    Pain Level (on 0 to 10 scale):  0  / 10   Medication Changes/New allergies or changes in medical history, any new surgeries or procedures?    no    If yes, update Summary List   Subjective Functional Status/Changes:  []  No changes reported     \"The dizziness is slowly getting better. \"   Reports compliance with HEP. OBJECTIVE    41 min Neuromuscular Re-ed:    Rationale:      increase ROM, increase strength, improve coordination, improve balance and increase proprioception to improve the patients ability to perform ADL's, gait, and functional mobility with increased safety and independence        min Patient Education:  YES  Reviewed HEP   []  Progressed/Changed HEP based on:   Progressed EC balance exrecise     Other Objective/Functional Measures:    EC MSR(bun): 30\" B    EO SR on AirEx: left 30\"/right 8\"  EC MSR(bun) on AirEx: 30\" B (+ sway)    Dynamic gait: 1) gait with horizontal and vertical head turns, 2) gait with eyes closed, 3) backward walk, 4) tandem walk. Increased resistance with standing hip 3 way.      Post Treatment Pain Level (on 0 to 10) scale:   0  / 10     ASSESSMENT    Assessment/Changes in Function:     Progressed EC balance exercises      []  See Progress Note/Recertification   Patient will continue to benefit from skilled PT services to modify and progress therapeutic interventions, address functional mobility deficits, address ROM deficits, address strength deficits, analyze and address soft tissue restrictions, analyze and cue movement patterns, analyze and modify body mechanics/ergonomics, assess and modify postural abnormalities, address imbalance/dizziness and instruct in home and community integration to attain remaining goals. Progress toward goals / Updated goals:    · Goals for this certification period include and are to be achieved in   2-4  weeks:  1. Patient will demonstrate independence with HEP. Reports compliance with HEP. 2. Patient will report 50% improvement in dizziness to allow improved activity tolerance and increased safety with functional mobility and gait. Dizziness is decreasing  3. Patient will score 72/100 on FOTO to indicate improved function.      PLAN    [x]  Upgrade activities as tolerated YES Continue plan of care   []  Discharge due to :    []  Other:      Therapist: Cameron Kirkland PT    Date: 8/25/2020 Time: 7:51 AM     Future Appointments   Date Time Provider Yandy Ramos   8/25/2020  8:00 AM Sonia Wright, PT [facility]   8/26/2020  8:00 AM Sonia Wright, PT [facility]   9/1/2020  8:00 AM Sue Madrigal, PT [facility]   9/3/2020  8:00 AM Sonia Wright, PT [facility]   9/8/2020  8:00 AM Sue Madrigal, PT [facility]   9/10/2020  8:00 AM Sonia Wright, PT [facility]   9/15/2020  8:00 AM Sue Madrigal, PT [facility]   9/17/2020  8:00 AM Sue Madrigal, PT [facility]

## 2020-08-26 ENCOUNTER — HOSPITAL ENCOUNTER (OUTPATIENT)
Dept: PHYSICAL THERAPY | Age: 75
Discharge: HOME | End: 2020-08-26
Payer: MEDICARE

## 2020-08-26 PROCEDURE — 97110 THERAPEUTIC EXERCISES: CPT

## 2020-08-26 PROCEDURE — 97112 NEUROMUSCULAR REEDUCATION: CPT

## 2020-09-01 ENCOUNTER — HOSPITAL ENCOUNTER (OUTPATIENT)
Dept: PHYSICAL THERAPY | Age: 75
Discharge: HOME | End: 2020-09-01
Payer: MEDICARE

## 2020-09-01 PROCEDURE — 97112 NEUROMUSCULAR REEDUCATION: CPT

## 2020-09-01 PROCEDURE — 97110 THERAPEUTIC EXERCISES: CPT

## 2020-09-01 NOTE — PROGRESS NOTES
PHYSICAL THERAPY - DAILY TREATMENT NOTE    Patient Name: Junior Zhang        Date: 2020  : 1945   YES Patient  Verified  Visit #:     Insurance: Payor: Alfred Sensor / Plan: [place] MEDICARE PART A & B / Product Type: Medicare /      In time: 8:00 Out time: 8:40   Total Treatment Time: 40     Medicare/BCBS [place] Time Tracking (below)   Total Timed Codes (min):  40 1:1 Treatment Time:  40     TREATMENT AREA =  Gait instability [R26.81]  Vestibular ataxic gait [R26.0]  SUBJECTIVE    Pain Level (on 0 to 10 scale):  0  / 10   Medication Changes/New allergies or changes in medical history, any new surgeries or procedures? NO    If yes, update Summary List   Subjective Functional Status/Changes:  []  No changes reported     \"My dizziness has gotten so much better. I can even bend over now without getting dizzy. \"        OBJECTIVE    25 min Therapeutic Exercise:  [x]  See flow sheet   Rationale:      increase ROM, increase strength, improve coordination, improve balance and increase proprioception to improve the patients ability to perform ADLs/IADLs, functional mobility and gait safely and independently without increased pain/symptoms     15 min Neuromuscular Re-ed: Tap-ups, EC balance on floor, EO/EC balance on foam, gait exercises   Rationale: improve balance to improve the patient's ability to perform ADLs/IADLs, functional mobility and gait safely and independently without increased pain/symptoms      During TE min Patient Education:  YES  Reviewed HEP   []  Progressed/Changed HEP based on:   Cont HEP     Other Objective/Functional Measures:    Exercises per flowsheet  Increased resistance stand hip 3-way     Post Treatment Pain Level (on 0 to 10) scale:   0  / 10     ASSESSMENT    Assessment/Changes in Function:     Tolerated exercises without complaints     []  See Progress Note/Recertification   Patient will continue to benefit from skilled PT services to modify and progress therapeutic interventions, address functional mobility deficits, address ROM deficits, address strength deficits, analyze and address soft tissue restrictions, analyze and cue movement patterns, analyze and modify body mechanics/ergonomics, assess and modify postural abnormalities, address imbalance/dizziness and instruct in home and community integration to attain remaining goals. Progress toward goals / Updated goals:    Progressing toward goals:  1. Patient will demonstrate independence with HEP.   2. Patient will report 50% improvement in dizziness to allow improved activity tolerance and increased safety with functional mobility and gait. progressing - Able to bend over without dizziness  3. Patient will score 72/100 on FOTO to indicate improved function.        PLAN    [x]  Upgrade activities as tolerated YES Continue plan of care   []  Discharge due to :    []  Other:      Therapist: Keisha Do PT    Date: 9/1/2020 Time: 8:04 AM     Future Appointments   Date Time Provider Yandy Ramos   9/3/2020  8:00 AM Lesli Rascon PT [facility]   9/8/2020  8:00 AM Delbert Gordon PT [facility]   9/10/2020  8:00 AM Lesli Rascon PT [facility]   9/15/2020  8:00 AM Delbert Gordon PT [facility]   9/17/2020  8:00 AM Delbert Gordon PT [facility]

## 2020-09-03 ENCOUNTER — HOSPITAL ENCOUNTER (OUTPATIENT)
Dept: PHYSICAL THERAPY | Age: 75
Discharge: HOME | End: 2020-09-03
Payer: MEDICARE

## 2020-09-03 PROCEDURE — 97110 THERAPEUTIC EXERCISES: CPT

## 2020-09-03 NOTE — PROGRESS NOTES
PHYSICAL THERAPY - DAILY TREATMENT NOTE    Patient Name: Zane Roth        Date: 9/3/2020  : 1945   YES Patient  Verified  Visit #:     Insurance: Payor: Gerhardt Hinders / Plan: [place] MEDICARE PART A & B / Product Type: Medicare /      In time: 8:04 Out time: 8:43   Total Treatment Time: 39     Medicare/BCBS [place] Time Tracking (below)   Total Timed Codes (min):  39 1:1 Treatment Time:  39     TREATMENT AREA =  Gait instability [R26.81]  Vestibular ataxic gait [R26.0]    SUBJECTIVE    Pain Level (on 0 to 10 scale):  0  / 10   Medication Changes/New allergies or changes in medical history, any new surgeries or procedures?    no    If yes, update Summary List   Subjective Functional Status/Changes:  []  No changes reported     \"I drove myself here today. \"  \"The only thing that still makes me dizzy now is when I am lying in bed an lift my head up and turn it to see where my dogs are. \"   \"The exercises wore me out last time. I had to go home and take a nap. \"         OBJECTIVE    37 min Therapeutic Exercise:  [x]  See flow sheet   Rationale:      increase ROM, increase strength, improve coordination, improve balance and increase proprioception to improve the patients ability to perform ADL's, gait, and functional mobility with increased safety and decreased symptoms. 2 (billed with there ex) min Neuromuscular Re-ed: VSE   Rationale:      improve coordination and improve balance to improve the patients ability to perform ADL's, gait, and functional mobility with increased safety and decreased symptoms. min Patient Education:  YES  Reviewed HEP   []  Progressed/Changed HEP based on:   Progressed VSE to standing with background     Other Objective/Functional Measures:    No progression of LE strengthening exercises due to reports of fatigue after last session.   VSE stand with background    Patient supine with head of 2 and 3 pillows with AROM cervical rotation - no reproduction of dizziness. Patient reported mild symptoms with AROM supine cervical flexion/rotation. Patient performed supine AROM cervical flexion 10 reps with no reports of dizziness, but had reports of mild pain in C spine after exercise. Post Treatment Pain Level (on 0 to 10) scale:   0  / 10     ASSESSMENT    Assessment/Changes in Function:     Mild complaints of cervical pain after supine AROM cervical flexion. No other complaints with exercises. []  See Progress Note/Recertification   Patient will continue to benefit from skilled PT services to modify and progress therapeutic interventions, address functional mobility deficits, address ROM deficits, address strength deficits, analyze and address soft tissue restrictions, analyze and cue movement patterns, analyze and modify body mechanics/ergonomics, assess and modify postural abnormalities, address imbalance/dizziness and instruct in home and community integration to attain remaining goals. Progress toward goals / Updated goals:    Progressing toward goals:  1. Patient will demonstrate independence with HEP.   cont HEP  2. Patient will report 50% improvement in dizziness to allow improved activity tolerance and increased safety with functional mobility and gait. continues to report decreased dizziness  3. Patient will score 72/100 on FOTO to indicate improved function.      PLAN    [x]  Upgrade activities as tolerated YES Continue plan of care   []  Discharge due to :    []  Other:      Therapist: Gisela Barnes PT    Date: 9/3/2020 Time: 7:10 AM     Future Appointments   Date Time Provider Yandy Ramos   9/3/2020  8:00 AM Peter Walton, PT [facility]   9/8/2020  8:00 AM Clayton Fabian PT [facility]   9/10/2020  8:00 AM Peter Walton PT [facility]   9/15/2020  8:00 AM Clayton Fabian PT [facility]   9/17/2020  8:00 AM Clayton Fabian PT [facility]

## 2020-09-08 ENCOUNTER — HOSPITAL ENCOUNTER (OUTPATIENT)
Dept: PHYSICAL THERAPY | Age: 75
Discharge: HOME | End: 2020-09-08
Payer: MEDICARE

## 2020-09-08 PROCEDURE — 97110 THERAPEUTIC EXERCISES: CPT

## 2020-09-08 NOTE — PROGRESS NOTES
PHYSICAL THERAPY - DAILY TREATMENT NOTE    Patient Name: Mckenna Rodriguez        Date: 2020  : 1945   YES Patient  Verified  Visit #:     Insurance: Payor: Eleazar High / Plan: [place] MEDICARE PART A & B / Product Type: Medicare /      In time: 8:04 Out time: 8:44   Total Treatment Time: 40     Medicare/BCBS [place] Time Tracking (below)   Total Timed Codes (min):  40 1:1 Treatment Time:  40     TREATMENT AREA =  Gait instability [R26.81]  Vestibular ataxic gait [R26.0]  SUBJECTIVE    Pain Level (on 0 to 10 scale):  0   / 10   Medication Changes/New allergies or changes in medical history, any new surgeries or procedures? NO    If yes, update Summary List   Subjective Functional Status/Changes:  []  No changes reported     Pt without complaints.           OBJECTIVE    40 min Therapeutic Exercise:  [x]  See flow sheet   Rationale:      increase ROM, increase strength, improve coordination, improve balance and increase proprioception to improve the patients ability to perform ADLs/IADLs, functional mobility and gait safely and independently without increased pain/symptoms     During TE min Patient Education:  YES  Reviewed HEP   []  Progressed/Changed HEP based on:   Cont HEP     Other Objective/Functional Measures:    Exercises per flowsheet  Increased reps stand hip 3-way  Performed piriformis stretch seated     Post Treatment Pain Level (on 0 to 10) scale:   0  / 10     ASSESSMENT    Assessment/Changes in Function:     Tolerated exercises without complaints     []  See Progress Note/Recertification   Patient will continue to benefit from skilled PT services to modify and progress therapeutic interventions, address functional mobility deficits, address ROM deficits, address strength deficits, analyze and address soft tissue restrictions, analyze and cue movement patterns, analyze and modify body mechanics/ergonomics, assess and modify postural abnormalities, address imbalance/dizziness and instruct in home and community integration to attain remaining goals. Progress toward goals / Updated goals:    Progressing toward goals:  1. Patient will demonstrate independence with HEP.  Cont HEP  2. Patient will report 50% improvement in dizziness to allow improved activity tolerance and increased safety with functional mobility and gait. continues to report decreased dizziness. Denies dizziness  3. Patient will score 72/100 on FOTO to indicate improved function.        PLAN    [x]  Upgrade activities as tolerated YES Continue plan of care   []  Discharge due to :    []  Other:      Therapist: Jasmin Leslie PT    Date: 9/8/2020 Time: 8:07 AM     Future Appointments   Date Time Provider Yandy Ramos   9/10/2020  8:00 AM Tom Humphreys, PT [facility]   9/15/2020  8:00 AM Elijah Suarez PT [facility]   9/17/2020  8:00 AM Elijah Suarez, PT [facility]

## 2020-09-10 ENCOUNTER — HOSPITAL ENCOUNTER (OUTPATIENT)
Dept: PHYSICAL THERAPY | Age: 75
Discharge: HOME | End: 2020-09-10
Payer: MEDICARE

## 2020-09-10 PROCEDURE — 97110 THERAPEUTIC EXERCISES: CPT

## 2020-09-10 PROCEDURE — 97112 NEUROMUSCULAR REEDUCATION: CPT

## 2020-09-15 ENCOUNTER — HOSPITAL ENCOUNTER (OUTPATIENT)
Dept: PHYSICAL THERAPY | Age: 75
Discharge: HOME | End: 2020-09-15
Payer: MEDICARE

## 2020-09-15 PROCEDURE — 97112 NEUROMUSCULAR REEDUCATION: CPT

## 2020-09-15 PROCEDURE — 97110 THERAPEUTIC EXERCISES: CPT

## 2020-09-15 NOTE — PROGRESS NOTES
PHYSICAL THERAPY - DAILY TREATMENT NOTE    Patient Name: Monica Crawford        Date: 9/15/2020  : 1945   YES Patient  Verified  Visit #:   15   of   8-16  Insurance: Payor: Kulwinder Pavon / Plan: [place] MEDICARE PART A & B / Product Type: Medicare /      In time: 8:03 Out time: 8:44   Total Treatment Time: 41     Medicare/BCBS [place] Time Tracking (below)   Total Timed Codes (min):  41 1:1 Treatment Time:  41     TREATMENT AREA =  Gait instability [R26.81]  Vestibular ataxic gait [R26.0]  SUBJECTIVE    Pain Level (on 0 to 10 scale):  0  / 10   Medication Changes/New allergies or changes in medical history, any new surgeries or procedures? NO    If yes, update Summary List   Subjective Functional Status/Changes:  []  No changes reported     Pt without complaints. Pt reports that she has not had any dizziness for the past 2 weeks. Pt reports that she has been able to return to driving. Pt reports compliance with HEP and denies difficulty with any exercises included in HEP.         OBJECTIVE    33 min Therapeutic Exercise:  [x]  See flow sheet   Rationale:      increase ROM, increase strength, improve coordination, improve balance and increase proprioception to improve the patients ability to perform ADLs/IADLs, functional mobility and gait safely and independently without increased pain/symptoms     8 min Neuromuscular Re-ed: Gait ex per flowsheet   Rationale: improve dynamic standing balance to improve the patient's ability to perform ADLs/IADLs, functional mobility and gait safely and independently without increased pain/symptoms      During TE/NM min Patient Education:  YES  Reviewed HEP   []  Progressed/Changed HEP based on:   Cont HEP     Other Objective/Functional Measures:    Exercises per flowsheet     Post Treatment Pain Level (on 0 to 10) scale:   0  / 10     ASSESSMENT    Assessment/Changes in Function:     Tolerated exercises without complaints     []  See Progress Note/Recertification Patient will continue to benefit from skilled PT services to modify and progress therapeutic interventions, address functional mobility deficits, address ROM deficits, address strength deficits, analyze and address soft tissue restrictions, analyze and cue movement patterns, analyze and modify body mechanics/ergonomics, assess and modify postural abnormalities, address imbalance/dizziness and instruct in home and community integration to attain remaining goals. Progress toward goals / Updated goals:    Progressing toward goals:  1. Patient will demonstrate independence with HEP.  Reports continued compliance with HEP  2. Patient will report 50% improvement in dizziness to allow improved activity tolerance and increased safety with functional mobility and gait. continues to report decreased dizziness.  Denies dizziness for past 2 weeks  3. Patient will score 72/100 on FOTO to indicate improved function.        PLAN    [x]  Upgrade activities as tolerated YES Continue plan of care   []  Discharge due to :    [x]  Other: DC with HEP NV     Therapist: Tommie Flaherty PT    Date: 9/15/2020 Time: 8:06 AM     Future Appointments   Date Time Provider Yandy Ramos   9/17/2020  8:00 AM Ijeoma Castillo PT [facility]

## 2020-09-17 ENCOUNTER — HOSPITAL ENCOUNTER (OUTPATIENT)
Dept: PHYSICAL THERAPY | Age: 75
Discharge: HOME | End: 2020-09-17
Payer: MEDICARE

## 2020-09-17 PROCEDURE — 97530 THERAPEUTIC ACTIVITIES: CPT

## 2020-09-17 PROCEDURE — 97112 NEUROMUSCULAR REEDUCATION: CPT

## 2020-09-17 NOTE — PROGRESS NOTES
PHYSICAL THERAPY - DAILY TREATMENT NOTE    Patient Name: Mckenna Rodriguez        Date: 2020  : 1945   YES Patient  Verified  Visit #:   16     Insurance: Payor: Eleazar High / Plan: [place] MEDICARE PART A & B / Product Type: Medicare /      In time: 8:03 Out time: 8:41   Total Treatment Time: 38     Medicare/BCBS [place] Time Tracking (below)   Total Timed Codes (min):  38 1:1 Treatment Time:  38     TREATMENT AREA =  Gait instability [R26.81]  Vestibular ataxic gait [R26.0]  SUBJECTIVE    Pain Level (on 0 to 10 scale):  0  / 10   Medication Changes/New allergies or changes in medical history, any new surgeries or procedures? NO    If yes, update Summary List   Subjective Functional Status/Changes:  []  No changes reported     Pt reports mild queasiness during SOT, but reports that it is much less than during initial SOT.           OBJECTIVE    23 min Therapeutic Activity: FOTO, FGA, 10MWT, FTSST   Rationale: assess ADL/IADL function, functional mobility and gait to improve the patient's ability to perform ADLs/IADLs, functional mobility and gait safely and independently without increased symptoms       15 min Neuromuscular Re-ed: SOT   Rationale: assess balance to improve the patien'ts ability to perform ADLs/IADLs, functional mobility and gait safely and independently without increased symptoms      During TA/NM min Patient Education:  YES  Reviewed HEP   []  Progressed/Changed HEP based on:   Issued vestibular taper instructions and DC instructions     Other Objective/Functional Measures:    FOTO = 97/100    FGA = 26/30    10MWT = 6\" (1.0 m/s)    FTSST = 11\"    SOT composite = 69 (WNL); abnormal performance on conditions 4/6; abnormal VIS score     Post Treatment Pain Level (on 0 to 10) scale:   0  / 10     ASSESSMENT    Assessment/Changes in Function:     See discharge note     []  See Progress Note/Recertification      Progress toward goals / Updated goals:    See discharge note PLAN    []  Upgrade activities as tolerated NO Continue plan of care   [x]  Discharge due to : Goals met, I with HEP   []  Other:      Therapist: Matthew Wayne PT    Date: 9/17/2020 Time: 8:01 AM     No future appointments. negative...

## 2020-09-17 NOTE — PROGRESS NOTES
[address] [facility] PHYSICAL THERAPY  [address], [address] - Phone: [phone]  Fax: [phone]  DISCHARGE SUMMARY FOR PHYSICAL THERAPY          Patient Name: Bobby Naidu :    Treatment/Medical Diagnosis: Gait instability [R26.81]  Vestibular ataxic gait [R26.0]   Onset Date: 2020      Referral Source: Delfino Galicia MD [facility]): 2020   Prior Hospitalization: See Medical History Provider #: [id number]   Prior Level of Function: Independent with ADLs/IADLs, functional mobility and gait; prior h/o vertigo and migraine   Comorbidities: Depression, osteoporosis, arthritis, visual impairment, hearing impairment, vertigo, migraines   Medications: Verified on Patient Summary List   Visits from [facility]: 16 Missed Visits: 0     Goal/Measure of Progress Goal Met? 1. Patient will demonstrate independence with HEP. Status at last Eval: Compliant with HEP Current Status: Independent with HEP yes   2. Patient will report 50% improvement in dizziness to allow improved activity tolerance and increased safety with functional mobility and gait. Status at last Eval: NA Current Status: Reports dizziness resolved yes   3. Patient will score 72/100 on FOTO to indicate improved function. Status at last Eval: FOTO = 69/100 Current Status: FOTO = 97/100 yes     Key Functional Changes/Progress: Patient has progressed with exercises in clinic, and demonstrates independence with HEP. FOTO has increased to 97/100, indicating improved function. FGA has improved to 26/30, indicating improved balance/decreased fall risk with ambulation. Gait speed as measured by 10MWT remains 1.0 m/s. FTSST has improved to 11\". SOT composite has improved to 69. VIS score remains slightly below normal, indicating slight difficulty using visual cues to maintain balance. Static standing balance WNL.   Patient denies dizziness at this time.    Assessments/Recommendations: Discontinue therapy. Progressing towards or have reached established goals. If you have any questions/comments please contact us directly at [phone]. Thank you for allowing us to assist in the care of your patient. Therapist Signature: Vinayak Watson PT Date: 9/17/2020   Reporting Period: 8/20/2020-9/17/2020 Time: 8:01 AM     NOTE TO PHYSICIAN:  PLEASE COMPLETE THE ORDERS BELOW AND FAX TO   [facility] Physical Therapy: [phone]. If you are unable to process this request in 24 hours please contact our office: [phone].    ___ I have read the above report and request that my patient be discharged from therapy.      Physician Signature:        Date:______Time:

## 2022-05-12 ENCOUNTER — TRANSCRIBE ORDER (OUTPATIENT)
Dept: SCHEDULING | Age: 77
End: 2022-05-12

## 2022-05-12 DIAGNOSIS — Z12.31 SCREENING MAMMOGRAM FOR BREAST CANCER: Primary | ICD-10-CM

## 2022-05-26 ENCOUNTER — TRANSCRIBE ORDER (OUTPATIENT)
Dept: SCHEDULING | Age: 77
End: 2022-05-26

## 2022-05-26 DIAGNOSIS — M85.89 OTHER SPECIFIED DISORDERS OF BONE DENSITY AND STRUCTURE, MULTIPLE SITES: Primary | ICD-10-CM

## 2022-07-06 ENCOUNTER — HOSPITAL ENCOUNTER (OUTPATIENT)
Dept: BONE DENSITY | Age: 77
Discharge: HOME | End: 2022-07-06
Attending: FAMILY MEDICINE
Payer: MEDICARE

## 2022-07-06 ENCOUNTER — HOSPITAL ENCOUNTER (OUTPATIENT)
Dept: WOMENS IMAGING | Age: 77
Discharge: HOME | End: 2022-07-06
Attending: FAMILY MEDICINE
Payer: MEDICARE

## 2022-07-06 DIAGNOSIS — M85.89 OTHER SPECIFIED DISORDERS OF BONE DENSITY AND STRUCTURE, MULTIPLE SITES: ICD-10-CM

## 2022-07-06 DIAGNOSIS — Z12.31 SCREENING MAMMOGRAM FOR BREAST CANCER: ICD-10-CM

## 2022-07-06 PROCEDURE — 77063 BREAST TOMOSYNTHESIS BI: CPT

## 2022-07-06 PROCEDURE — 77080 DXA BONE DENSITY AXIAL: CPT

## 2022-07-21 ENCOUNTER — HOSPITAL ENCOUNTER (OUTPATIENT)
Dept: WOMENS IMAGING | Age: 77
Discharge: HOME | End: 2022-07-21
Attending: FAMILY MEDICINE
Payer: MEDICARE

## 2022-07-21 ENCOUNTER — HOSPITAL ENCOUNTER (OUTPATIENT)
Dept: ULTRASOUND IMAGING | Age: 77
Discharge: HOME | End: 2022-07-21
Attending: FAMILY MEDICINE
Payer: MEDICARE

## 2022-07-21 DIAGNOSIS — R92.8 ABNORMAL FINDING ON BREAST IMAGING: ICD-10-CM

## 2022-07-21 PROCEDURE — 76642 ULTRASOUND BREAST LIMITED: CPT

## 2022-07-21 PROCEDURE — 77061 BREAST TOMOSYNTHESIS UNI: CPT

## 2023-10-16 ENCOUNTER — HOSPITAL ENCOUNTER (OUTPATIENT)
Dept: WOMENS IMAGING | Facility: HOSPITAL | Age: 78
Discharge: HOME | End: 2023-10-19
Payer: MEDICARE

## 2023-10-16 DIAGNOSIS — Z12.31 ENCOUNTER FOR SCREENING MAMMOGRAM FOR MALIGNANT NEOPLASM OF BREAST: ICD-10-CM

## 2023-10-16 PROCEDURE — 77063 BREAST TOMOSYNTHESIS BI: CPT

## 2023-10-23 ENCOUNTER — HOSPITAL ENCOUNTER (OUTPATIENT)
Facility: HOSPITAL | Age: 78
Discharge: HOME | End: 2023-10-26
Payer: MEDICARE

## 2023-10-23 DIAGNOSIS — M85.89 OTHER SPECIFIED DISORDERS OF BONE DENSITY AND STRUCTURE, MULTIPLE SITES: ICD-10-CM

## 2023-10-23 PROCEDURE — 77080 DXA BONE DENSITY AXIAL: CPT

## 2024-10-17 ENCOUNTER — HOSPITAL ENCOUNTER (OUTPATIENT)
Dept: WOMENS IMAGING | Facility: HOSPITAL | Age: 79
Discharge: HOME | End: 2024-10-20
Payer: MEDICARE

## 2024-10-17 DIAGNOSIS — Z12.31 SCREENING MAMMOGRAM FOR BREAST CANCER: ICD-10-CM

## 2024-10-17 PROCEDURE — 77063 BREAST TOMOSYNTHESIS BI: CPT
